# Patient Record
Sex: FEMALE | Race: BLACK OR AFRICAN AMERICAN
[De-identification: names, ages, dates, MRNs, and addresses within clinical notes are randomized per-mention and may not be internally consistent; named-entity substitution may affect disease eponyms.]

---

## 2019-08-02 ENCOUNTER — HOSPITAL ENCOUNTER (OUTPATIENT)
Dept: HOSPITAL 92 - BICULT | Age: 34
Discharge: HOME | End: 2019-08-02
Attending: PHYSICIAN ASSISTANT
Payer: COMMERCIAL

## 2019-08-02 DIAGNOSIS — N93.0: ICD-10-CM

## 2019-08-02 DIAGNOSIS — D25.0: Primary | ICD-10-CM

## 2019-08-02 DIAGNOSIS — N83.202: ICD-10-CM

## 2019-08-02 PROCEDURE — 76856 US EXAM PELVIC COMPLETE: CPT

## 2019-08-02 NOTE — ULT
PELVIC ULTRASOUND:

 

History: Bleeding after intercourse. Fibroid. 

 

Comparison: None. 

 

Technique: Transabdominal and endovaginal imaging of the pelvis was performed. Ovaries are interrogat
ed with grayscale, color flow, doppler imaging with spectral waveform analysis. 

 

FINDINGS: 

Uterus is identified, measuring 6.3 x 5.6 x 10.7 cm. in the lower uterine segment there is a solid ec
hotexture mass measuring 1.8 x 2.4 x 2.6 cm. This lesion is submucosal in location. Endometrial diame
ter is 0.6 cm. 

 

Both ovaries have a normal echotexture. Right ovary measures 1.4 x 1.9 x 3.2 cm. Left ovary measures 
2.7 x 3.0 x 3.6 cm. Anechoic focus associated with the left ovary, compatible with a 2.3 x 2.2 x 1.7 
cm cyst. 

 

Trace amount of free fluid. 

 

Ovarian Doppler:

Vascular flow to both ovaries. 

 

IMPRESSION: 

1. Submucosal fibroid in the lower uterine segment. 

2. Left ovarian cyst. 

 

 

 

POS: PPP

## 2020-07-08 ENCOUNTER — HOSPITAL ENCOUNTER (EMERGENCY)
Dept: HOSPITAL 92 - ERS | Age: 35
LOS: 1 days | Discharge: HOME | End: 2020-07-09
Payer: SELF-PAY

## 2020-07-08 DIAGNOSIS — M19.90: ICD-10-CM

## 2020-07-08 DIAGNOSIS — O99.89: ICD-10-CM

## 2020-07-08 DIAGNOSIS — Z3A.01: ICD-10-CM

## 2020-07-08 DIAGNOSIS — O20.9: Primary | ICD-10-CM

## 2020-07-08 LAB
BASOPHILS # BLD AUTO: 0.1 THOU/UL (ref 0–0.2)
BASOPHILS NFR BLD AUTO: 1.4 % (ref 0–1)
EOSINOPHIL # BLD AUTO: 0.2 THOU/UL (ref 0–0.7)
EOSINOPHIL NFR BLD AUTO: 2.4 % (ref 0–10)
HGB BLD-MCNC: 13.4 G/DL (ref 12–16)
LYMPHOCYTES # BLD: 3.3 THOU/UL (ref 1.2–3.4)
LYMPHOCYTES NFR BLD AUTO: 44.4 % (ref 21–51)
MCH RBC QN AUTO: 31.7 PG (ref 27–31)
MCV RBC AUTO: 92.5 FL (ref 78–98)
MONOCYTES # BLD AUTO: 0.4 THOU/UL (ref 0.11–0.59)
MONOCYTES NFR BLD AUTO: 5.7 % (ref 0–10)
NEUTROPHILS # BLD AUTO: 3.4 THOU/UL (ref 1.4–6.5)
NEUTROPHILS NFR BLD AUTO: 46 % (ref 42–75)
PLATELET # BLD AUTO: 236 THOU/UL (ref 130–400)
RBC # BLD AUTO: 4.23 MILL/UL (ref 4.2–5.4)
WBC # BLD AUTO: 7.5 THOU/UL (ref 4.8–10.8)

## 2020-07-08 PROCEDURE — 87480 CANDIDA DNA DIR PROBE: CPT

## 2020-07-08 PROCEDURE — 87591 N.GONORRHOEAE DNA AMP PROB: CPT

## 2020-07-08 PROCEDURE — 81015 MICROSCOPIC EXAM OF URINE: CPT

## 2020-07-08 PROCEDURE — 86900 BLOOD TYPING SEROLOGIC ABO: CPT

## 2020-07-08 PROCEDURE — 81003 URINALYSIS AUTO W/O SCOPE: CPT

## 2020-07-08 PROCEDURE — 87660 TRICHOMONAS VAGIN DIR PROBE: CPT

## 2020-07-08 PROCEDURE — 86901 BLOOD TYPING SEROLOGIC RH(D): CPT

## 2020-07-08 PROCEDURE — 76856 US EXAM PELVIC COMPLETE: CPT

## 2020-07-08 PROCEDURE — 87491 CHLMYD TRACH DNA AMP PROBE: CPT

## 2020-07-08 PROCEDURE — 84702 CHORIONIC GONADOTROPIN TEST: CPT

## 2020-07-08 PROCEDURE — 87510 GARDNER VAG DNA DIR PROBE: CPT

## 2020-07-08 PROCEDURE — 85025 COMPLETE CBC W/AUTO DIFF WBC: CPT

## 2020-07-08 PROCEDURE — 36415 COLL VENOUS BLD VENIPUNCTURE: CPT

## 2020-07-09 LAB
LEUKOCYTE ESTERASE UR QL STRIP.AUTO: 25 LEU/UL
PROT UR STRIP.AUTO-MCNC: 30 MG/DL
RBC UR QL AUTO: (no result) HPF (ref 0–3)
SP GR UR STRIP: 1.03 (ref 1–1.04)
WBC UR QL AUTO: (no result) HPF (ref 0–3)

## 2020-07-09 NOTE — ULT
FIRST TRIMESTER OBSTETRICAL ULTRASOUND:

 

Date:  2020

 

INDICATION:

Evaluate for ectopic pregnancy with positive beta HCG. 

 

TECHNIQUE:  

Gray scale, color Doppler, and spectral Doppler images were obtained of the pelvis via transabdominal
 and transvaginal approach. 

 

Comparison made with prior pelvic ultrasound dated 2019. 

 

FINDINGS:

No intrauterine gestation is demonstrated. The uterus measures 11.7 x 4.6 x 6.8 cm. Endometrial strip
e is thickened, measuring 1.3 cm. The left ovary has a normal sonographic appearance and flow. The ri
ght ovary demonstrates a mildly complex nonvascular lesion with a peripheral ring of fire measuring 1
.4 cm, suspicious for a corpus luteal cyst. No extrauterine adnexal mass is grossly evident. There is
 mild free fluid in the pelvis. There is normal flow to the right ovary. 

 

IMPRESSION: 

1.  No intrauterine gestation demonstrated. 

2.  No definite extrauterine mass identified. 

3.  There is a 1.4 cm peripherally vascular, heterogeneous lesion within the right adnexa, most suspi
cious for a corpus luteal cyst. Ovarian ectopic is felt to be less likely. 

4.  Mild free fluid in the pelvis. 

5.  Recommend continued clinical and sonographic follow-up. Findings are consistent with a pregnancy 
of undetermined location. Findings may reflect early pregnancy, missed , or ectopic pregnancy
. 

 

 

POS: [facility]

## 2020-07-11 ENCOUNTER — HOSPITAL ENCOUNTER (EMERGENCY)
Dept: HOSPITAL 92 - ERS | Age: 35
Discharge: HOME | End: 2020-07-11
Payer: SELF-PAY

## 2020-07-11 DIAGNOSIS — O03.9: Primary | ICD-10-CM

## 2020-07-11 LAB
ALBUMIN SERPL BCG-MCNC: 4.3 G/DL (ref 3.5–5)
ALP SERPL-CCNC: 96 U/L (ref 40–110)
ALT SERPL W P-5'-P-CCNC: 15 U/L (ref 8–55)
ANION GAP SERPL CALC-SCNC: 16 MMOL/L (ref 10–20)
AST SERPL-CCNC: 16 U/L (ref 5–34)
BASOPHILS # BLD AUTO: 0.1 THOU/UL (ref 0–0.2)
BASOPHILS NFR BLD AUTO: 1.4 % (ref 0–1)
BILIRUB SERPL-MCNC: 0.5 MG/DL (ref 0.2–1.2)
BUN SERPL-MCNC: 6 MG/DL (ref 7–18.7)
CALCIUM SERPL-MCNC: 8.9 MG/DL (ref 7.8–10.44)
CHLORIDE SERPL-SCNC: 107 MMOL/L (ref 98–107)
CO2 SERPL-SCNC: 18 MMOL/L (ref 22–29)
CREAT CL PREDICTED SERPL C-G-VRATE: 0 ML/MIN (ref 70–130)
EOSINOPHIL # BLD AUTO: 0.1 THOU/UL (ref 0–0.7)
EOSINOPHIL NFR BLD AUTO: 2.8 % (ref 0–10)
GLOBULIN SER CALC-MCNC: 2.6 G/DL (ref 2.4–3.5)
GLUCOSE SERPL-MCNC: 97 MG/DL (ref 70–105)
HGB BLD-MCNC: 13.5 G/DL (ref 12–16)
LYMPHOCYTES # BLD: 1.7 THOU/UL (ref 1.2–3.4)
LYMPHOCYTES NFR BLD AUTO: 33.7 % (ref 21–51)
MCH RBC QN AUTO: 31.8 PG (ref 27–31)
MCV RBC AUTO: 92.5 FL (ref 78–98)
MONOCYTES # BLD AUTO: 0.4 THOU/UL (ref 0.11–0.59)
MONOCYTES NFR BLD AUTO: 7.5 % (ref 0–10)
NEUTROPHILS # BLD AUTO: 2.8 THOU/UL (ref 1.4–6.5)
NEUTROPHILS NFR BLD AUTO: 54.7 % (ref 42–75)
PLATELET # BLD AUTO: 214 THOU/UL (ref 130–400)
POTASSIUM SERPL-SCNC: 3.9 MMOL/L (ref 3.5–5.1)
RBC # BLD AUTO: 4.25 MILL/UL (ref 4.2–5.4)
SODIUM SERPL-SCNC: 137 MMOL/L (ref 136–145)
WBC # BLD AUTO: 5 THOU/UL (ref 4.8–10.8)

## 2020-07-11 PROCEDURE — 85025 COMPLETE CBC W/AUTO DIFF WBC: CPT

## 2020-07-11 PROCEDURE — 80053 COMPREHEN METABOLIC PANEL: CPT

## 2020-07-11 PROCEDURE — 36415 COLL VENOUS BLD VENIPUNCTURE: CPT

## 2020-07-11 PROCEDURE — 76856 US EXAM PELVIC COMPLETE: CPT

## 2020-07-11 PROCEDURE — 84702 CHORIONIC GONADOTROPIN TEST: CPT

## 2020-07-11 NOTE — ULT
ULTRASOUND TRANSVAGINAL

DOPPLER DUPLEX:



DATE:

7/11/2020



HISTORY:

Follow-up abnormal ultrasound for possible ectopic pregnancy.



COMPARISON:

7/9/2020



TECHNIQUE:

endovaginal transducer used to visualize intrapelvic contents with grayscale, color-flow, and spectra
l analysis.



FINDINGS:

Endometrial stripe is again thickened and heterogeneous, approximately 1.9 cm (19 mm).

No intrauterine gestational sac identified.

No significant free fluid identified within pelvic cavity.

Normal left ovary.

Right ovary is measured as approximately 2.3 x 1.8 x 1.6 cm.

At the upper portion of the right ovary, there is an approximately 1.8 x 1.8 x 1.7 cm ill-defined mix
ed intermediate and low echogenicity lesion of uncertain etiology. Perhaps this is a collapsed

hemorrhagic ovarian cyst. Ectopic pregnancy is not completely excluded. However, this is unchanged si
nce prior study.

At the inferior portion of the right ovary, either external to it and abutting the ovary, or within t
he ovarian parenchyma itself, there is an approximately 0.9 x 0.6 x 0.6 cm lesion with moderately

hyperechoic rim and heterogeneously mildly hypoechoic center. This was not identified on the prior ul
trasound. It may or may not represent a small ectopic pregnancy. No embryonic pole is visualized

within it.



IMPRESSION:

1) the lesion at the upper portion of the right ovarian parenchyma is unchanged since 2 days ago.

2) there is a new lesion either abutting or within the lower portion of the right ovary. Etiology unc
ertain. Ectopic pregnancy is a possibility.

3) recommend continued follow-up.



Reported By: Teofilo Keller 

Electronically Signed:  7/11/2020 1:17 PM

## 2020-07-11 NOTE — CON
DATE OF CONSULTATION:  07/11/2020



TIME:  1435 hours. 

INFORMAL (PHONE) CONSULT WITH DR ELVIA NUNO (ER)



In brief I talked to Dr. Nuno on the phone regarding this patient as I was just

finishing a delivery in Labor and Delivery.  This patient, per Dr. Nuno is a

34-year-old, G5, P3, who is 6 weeks by last menstrual period, who was seen on

Thursday with Dr. Maria Antonia Faith at that time, her beta HCG was 2072.  She had 
some

vaginal bleeding at that time.  She had some cramping.  She was told to follow 
up in

48 hours, which is today, for possible methotrexate or further evaluation.  



Today she follows up as scheduled and her beta-hCG is now 304.  Her Rh type is 
Rh positive

and her hematocrit is 39.  She is in no acute distress according to Dr. Nuno.  
It

is important to note that this is a phone consult and I did not see the patient 
in

person as the patient was clinically stable and Dr. Nuno was comfortable with 
this

phone consultation and plan.  The most likely diagnosis at this time, noting 
that

the patient is clinically stable, with a near normal hematocrit, and a

vastly/greatly decreased beta HCG that this was a spontaneous miscarriage early 
in

pregnancy.  Because her beta-hCG dropped 75% in 48 hours, 



I do not suspect that she

needs methotrexate as it may be spontaneously resolving.  I did recommend that 
she

has followup in 48 hours at any OB/GYN or family medicine location for another 
beta

HCG serum test.  At this time, Dr. Nuno agrees that she does not need a D and 
C or

methotrexate as she is clinically stable and is following a natural course of

regression. 







Job ID:  [id number]



[facility]

## 2021-03-12 ENCOUNTER — HOSPITAL ENCOUNTER (OUTPATIENT)
Dept: HOSPITAL 92 - BICRAD | Age: 36
Discharge: HOME | End: 2021-03-12
Attending: PHYSICIAN ASSISTANT
Payer: COMMERCIAL

## 2021-03-12 DIAGNOSIS — R76.12: Primary | ICD-10-CM

## 2021-03-12 PROCEDURE — 71046 X-RAY EXAM CHEST 2 VIEWS: CPT

## 2022-08-10 ENCOUNTER — HOSPITAL ENCOUNTER (OUTPATIENT)
Dept: HOSPITAL 92 - BICULT | Age: 37
Discharge: HOME | End: 2022-08-10
Attending: PHYSICIAN ASSISTANT
Payer: COMMERCIAL

## 2022-08-10 DIAGNOSIS — D25.9: ICD-10-CM

## 2022-08-10 DIAGNOSIS — R93.89: ICD-10-CM

## 2022-08-10 DIAGNOSIS — N93.9: Primary | ICD-10-CM

## 2022-08-10 PROCEDURE — 76856 US EXAM PELVIC COMPLETE: CPT
